# Patient Record
Sex: MALE | Race: WHITE | NOT HISPANIC OR LATINO | Employment: FULL TIME | URBAN - METROPOLITAN AREA
[De-identification: names, ages, dates, MRNs, and addresses within clinical notes are randomized per-mention and may not be internally consistent; named-entity substitution may affect disease eponyms.]

---

## 2024-05-24 ENCOUNTER — OFFICE VISIT (OUTPATIENT)
Dept: PRIMARY CARE | Facility: CLINIC | Age: 29
End: 2024-05-24
Payer: COMMERCIAL

## 2024-05-24 VITALS — TEMPERATURE: 98.4 F | SYSTOLIC BLOOD PRESSURE: 110 MMHG | WEIGHT: 154 LBS | DIASTOLIC BLOOD PRESSURE: 60 MMHG

## 2024-05-24 DIAGNOSIS — R10.31 RIGHT LOWER QUADRANT ABDOMINAL PAIN: Primary | ICD-10-CM

## 2024-05-24 DIAGNOSIS — K46.9 ABDOMINAL HERNIA WITHOUT OBSTRUCTION AND WITHOUT GANGRENE, RECURRENCE NOT SPECIFIED, UNSPECIFIED HERNIA TYPE: ICD-10-CM

## 2024-05-24 PROCEDURE — 99212 OFFICE O/P EST SF 10 MIN: CPT | Performed by: FAMILY MEDICINE

## 2024-05-24 NOTE — PROGRESS NOTES
Chief complaint:   Chief Complaint   Patient presents with    Annual Exam     Discuss possible  hemorrhoid        HPI:  Jonathan Andres is a 28 y.o. male who presents for evaluation of right lower abdominal pain which started 1.5 years ago when he started exercising and lifting weights. He got a gym membership and when working out is when he feels it. It can radiate to the right testicle. He reports he may have felt a bulge in the area. He stopped exercising within the past month or so and his pain has resolved since then.     No blood in urine or pain when he urinates currently  No diarrhea, constipation, nausea, vomiting heartburn  No testicular lumps    Physical exam:  /60 (BP Location: Left arm, Patient Position: Sitting)   Temp 36.9 °C (98.4 °F) (Oral)   Wt 69.9 kg (154 lb)   General: NAD, well appearing male  Heart: RRR, no mumur appreciated  Lungs: CTAB, no wheezes, rales, rhonchi  Abdomen: soft, non tender, bulge noted RLQ with increased valsalva, normoactive BS, no organomegaly  : no inguinal hernia palpated bilaterally, normal appearing male genitalia     Assessment/Plan   Problem List Items Addressed This Visit    None  Visit Diagnoses       Right lower quadrant abdominal pain    -  Primary    Relevant Orders    Referral to General Surgery    Abdominal hernia without obstruction and without gangrene, recurrence not specified, unspecified hernia type        Relevant Orders    Referral to General Surgery        Concern for abdominal hernia, referral to general surgery placed.     Sherry Watkins DO

## 2024-06-17 NOTE — PROGRESS NOTES
HPI    Jonathan Andres is a 28 y.o. male who was referred by Dr. Watkins for an abdominal hernia. He developed abdominal pain that can radiate to right testicle 1.5 years ago with exercising and lifting weights. He also felt a bulge. He stopped exercising and the pain resolved. He presents today to discuss.     He states that he started to work out a year and a half ago and developed some RLQ aching radiating abdominal pain. He was lifting weights and squats. He has since stopped working out, his symptoms have resolved. He denies any changes in bowel movements. He describes some blood in stool and on toilet paper, he denies any blood dripping. He denies dysuria or hematuria. He has never had a colonoscopy.     Colonoscopy: N/A    Non-smoker/Occasional ETOH use/No Illicit drug use  PMH: ADHD, Psoriasis,   PSH:  No abdominal surgical history  No family history of CRC or IBD  Employment: [employer]     Past Medical History:   Diagnosis Date    Muscle spasm of back 08/12/2019    Spasm of thoracic back muscle    Pruritus ani 08/12/2019    Pruritus ani       No past surgical history on file.    Current Outpatient Medications   Medication Sig Dispense Refill    clobetasol (Temovate) 0.05 % cream        No current facility-administered medications for this visit.         No Known Allergies    Review of Systems   Constitutional:  Negative for activity change, appetite change, chills, diaphoresis, fatigue, fever and unexpected weight change.   Respiratory:  Negative for cough, chest tightness and shortness of breath.    Cardiovascular:  Negative for chest pain, palpitations and leg swelling.   Gastrointestinal:  Negative for abdominal pain, anal bleeding, blood in stool, constipation, diarrhea, nausea, rectal pain and vomiting.   Genitourinary:  Negative for difficulty urinating, dysuria and hematuria.   Neurological:  Negative for dizziness, weakness and light-headedness.   All other systems reviewed and are  negative.      Physical Exam  Vitals reviewed. Exam conducted with a chaperone present.   Constitutional:       Appearance: Normal appearance.   HENT:      Head: Normocephalic.   Eyes:      Pupils: Pupils are equal, round, and reactive to light.   Cardiovascular:      Rate and Rhythm: Normal rate and regular rhythm.      Pulses: Normal pulses.      Heart sounds: Normal heart sounds. No murmur heard.  Pulmonary:      Effort: Pulmonary effort is normal. No respiratory distress.      Breath sounds: Normal breath sounds. No stridor. No wheezing, rhonchi or rales.   Chest:      Chest wall: No tenderness.   Abdominal:      General: There is no distension.      Palpations: Abdomen is soft. There is no mass.      Tenderness: There is no abdominal tenderness.      Hernia: No hernia is present.      Comments: No abdominal hernia observed on exam    Genitourinary:     Comments: Small external hemorrhoid in right anterior position.  Non-thrombosed, non-bleeding.  FAVIAN unremarkable.  Good rectal tone.    Musculoskeletal:         General: No swelling or deformity. Normal range of motion.      Cervical back: Normal range of motion.      Right lower leg: No edema.      Left lower leg: No edema.   Skin:     General: Skin is warm and dry.      Capillary Refill: Capillary refill takes less than 2 seconds.   Neurological:      General: No focal deficit present.      Mental Status: He is alert and oriented to person, place, and time. Mental status is at baseline.   Psychiatric:         Mood and Affect: Mood normal.         Behavior: Behavior normal.         Thought Content: Thought content normal.         Judgment: Judgment normal.       Anoscopy    Date/Time: 6/19/2024 10:06 AM    Performed by: Bear Emmanuel MD  Authorized by: Bear Emmanuel MD    Consent:     Consent obtained:  Verbal  Procedure details:     Internal hemorrhoids: no      Inflammation: no      Anal fissures: no      Anal fistulae: no      Anal stricture: no       Abscess: no      Tearing: no      Blood in rectal vault: no      Assessment and Plan:   #Right groin pain  -  No hernia on exam today  -  OK to resume full physical activity  -  Encourage to keep close eye on right groin and return should he develop bulging or recurrent pain    #Blood per rectum  -  Possibly related to external hemorrhoid  -  FAVIAN and anoscopy exam today otherwise unremarkable  -  Instructed patient to increase water intake, adopt high fiber diet  -  Instructed patient to return if this persists as will then need colonoscopic evaluation    Bear Emmanuel MD   6/19/2024  10:08 AM

## 2024-06-19 ENCOUNTER — OFFICE VISIT (OUTPATIENT)
Dept: SURGERY | Facility: CLINIC | Age: 29
End: 2024-06-19
Payer: COMMERCIAL

## 2024-06-19 VITALS
BODY MASS INDEX: 20.54 KG/M2 | TEMPERATURE: 98.2 F | HEART RATE: 55 BPM | HEIGHT: 73 IN | DIASTOLIC BLOOD PRESSURE: 75 MMHG | SYSTOLIC BLOOD PRESSURE: 114 MMHG | WEIGHT: 155 LBS

## 2024-06-19 DIAGNOSIS — K62.5 BLOOD PER RECTUM: Primary | ICD-10-CM

## 2024-06-19 DIAGNOSIS — R10.31 RIGHT GROIN PAIN: ICD-10-CM

## 2024-06-19 PROCEDURE — 99213 OFFICE O/P EST LOW 20 MIN: CPT | Performed by: STUDENT IN AN ORGANIZED HEALTH CARE EDUCATION/TRAINING PROGRAM

## 2024-06-19 PROCEDURE — 46600 DIAGNOSTIC ANOSCOPY SPX: CPT | Performed by: STUDENT IN AN ORGANIZED HEALTH CARE EDUCATION/TRAINING PROGRAM

## 2024-06-19 PROCEDURE — 99203 OFFICE O/P NEW LOW 30 MIN: CPT | Performed by: STUDENT IN AN ORGANIZED HEALTH CARE EDUCATION/TRAINING PROGRAM

## 2024-06-19 RX ORDER — CLOBETASOL PROPIONATE 0.5 MG/G
CREAM TOPICAL
COMMUNITY
Start: 2024-06-09

## 2024-06-19 ASSESSMENT — ENCOUNTER SYMPTOMS
CHILLS: 0
BLOOD IN STOOL: 0
ACTIVITY CHANGE: 0
FATIGUE: 0
APPETITE CHANGE: 0
PALPITATIONS: 0
LIGHT-HEADEDNESS: 0
DIARRHEA: 0
DIAPHORESIS: 0
CHEST TIGHTNESS: 0
HEMATURIA: 0
COUGH: 0
SHORTNESS OF BREATH: 0
VOMITING: 0
CONSTIPATION: 0
WEAKNESS: 0
UNEXPECTED WEIGHT CHANGE: 0
ANAL BLEEDING: 0
NAUSEA: 0
DYSURIA: 0
RECTAL PAIN: 0
ABDOMINAL PAIN: 0
FEVER: 0
DIFFICULTY URINATING: 0
DIZZINESS: 0